# Patient Record
Sex: MALE | Race: WHITE | NOT HISPANIC OR LATINO | Employment: FULL TIME | URBAN - METROPOLITAN AREA
[De-identification: names, ages, dates, MRNs, and addresses within clinical notes are randomized per-mention and may not be internally consistent; named-entity substitution may affect disease eponyms.]

---

## 2022-10-05 ENCOUNTER — APPOINTMENT (OUTPATIENT)
Dept: RADIOLOGY | Facility: HOSPITAL | Age: 52
End: 2022-10-05

## 2022-10-05 ENCOUNTER — HOSPITAL ENCOUNTER (EMERGENCY)
Facility: HOSPITAL | Age: 52
Discharge: HOME | End: 2022-10-05
Attending: EMERGENCY MEDICINE

## 2022-10-05 VITALS
WEIGHT: 200 LBS | HEART RATE: 75 BPM | DIASTOLIC BLOOD PRESSURE: 97 MMHG | TEMPERATURE: 98.3 F | OXYGEN SATURATION: 98 % | SYSTOLIC BLOOD PRESSURE: 154 MMHG | BODY MASS INDEX: 30.41 KG/M2 | RESPIRATION RATE: 18 BRPM

## 2022-10-05 DIAGNOSIS — R03.0 ELEVATED BLOOD PRESSURE READING: ICD-10-CM

## 2022-10-05 DIAGNOSIS — E87.6 HYPOKALEMIA: ICD-10-CM

## 2022-10-05 DIAGNOSIS — B34.9 VIRAL SYNDROME: Primary | ICD-10-CM

## 2022-10-05 LAB
ALBUMIN SERPL BCP-MCNC: 4.4 G/DL (ref 3.5–5)
ALP SERPL-CCNC: 121 U/L (ref 34–104)
ALT SERPL W P-5'-P-CCNC: 14 U/L (ref 7–52)
ANION GAP SERPL CALCULATED.3IONS-SCNC: 9 MMOL/L (ref 4–13)
AST SERPL W P-5'-P-CCNC: 17 U/L (ref 13–39)
BASOPHILS # BLD AUTO: 0.08 THOUSANDS/ΜL (ref 0–0.1)
BASOPHILS NFR BLD AUTO: 1 % (ref 0–1)
BILIRUB SERPL-MCNC: 0.5 MG/DL (ref 0.2–1)
BUN SERPL-MCNC: 11 MG/DL (ref 5–25)
CALCIUM SERPL-MCNC: 9.8 MG/DL (ref 8.4–10.2)
CARDIAC TROPONIN I PNL SERPL HS: 5 NG/L
CHLORIDE SERPL-SCNC: 102 MMOL/L (ref 96–108)
CO2 SERPL-SCNC: 28 MMOL/L (ref 21–32)
CREAT SERPL-MCNC: 0.94 MG/DL (ref 0.6–1.3)
EOSINOPHIL # BLD AUTO: 0.03 THOUSAND/ΜL (ref 0–0.61)
EOSINOPHIL NFR BLD AUTO: 0 % (ref 0–6)
ERYTHROCYTE [DISTWIDTH] IN BLOOD BY AUTOMATED COUNT: 13 % (ref 11.6–15.1)
FLUAV RNA RESP QL NAA+PROBE: NEGATIVE
FLUBV RNA RESP QL NAA+PROBE: NEGATIVE
GFR SERPL CREATININE-BSD FRML MDRD: 92 ML/MIN/1.73SQ M
GLUCOSE SERPL-MCNC: 82 MG/DL (ref 65–140)
HCT VFR BLD AUTO: 49.4 % (ref 36.5–49.3)
HGB BLD-MCNC: 16.7 G/DL (ref 12–17)
IMM GRANULOCYTES # BLD AUTO: 0.03 THOUSAND/UL (ref 0–0.2)
IMM GRANULOCYTES NFR BLD AUTO: 0 % (ref 0–2)
LYMPHOCYTES # BLD AUTO: 3 THOUSANDS/ΜL (ref 0.6–4.47)
LYMPHOCYTES NFR BLD AUTO: 24 % (ref 14–44)
MCH RBC QN AUTO: 29.7 PG (ref 26.8–34.3)
MCHC RBC AUTO-ENTMCNC: 33.8 G/DL (ref 31.4–37.4)
MCV RBC AUTO: 88 FL (ref 82–98)
MONOCYTES # BLD AUTO: 0.62 THOUSAND/ΜL (ref 0.17–1.22)
MONOCYTES NFR BLD AUTO: 5 % (ref 4–12)
NEUTROPHILS # BLD AUTO: 8.85 THOUSANDS/ΜL (ref 1.85–7.62)
NEUTS SEG NFR BLD AUTO: 70 % (ref 43–75)
NRBC BLD AUTO-RTO: 0 /100 WBCS
PLATELET # BLD AUTO: 290 THOUSANDS/UL (ref 149–390)
PMV BLD AUTO: 9.3 FL (ref 8.9–12.7)
POTASSIUM SERPL-SCNC: 3.4 MMOL/L (ref 3.5–5.3)
PROT SERPL-MCNC: 7.2 G/DL (ref 6.4–8.4)
RBC # BLD AUTO: 5.63 MILLION/UL (ref 3.88–5.62)
RSV RNA RESP QL NAA+PROBE: NEGATIVE
SARS-COV-2 RNA RESP QL NAA+PROBE: NEGATIVE
SODIUM SERPL-SCNC: 139 MMOL/L (ref 135–147)
WBC # BLD AUTO: 12.61 THOUSAND/UL (ref 4.31–10.16)

## 2022-10-05 PROCEDURE — 84484 ASSAY OF TROPONIN QUANT: CPT | Performed by: EMERGENCY MEDICINE

## 2022-10-05 PROCEDURE — 71045 X-RAY EXAM CHEST 1 VIEW: CPT

## 2022-10-05 PROCEDURE — 96374 THER/PROPH/DIAG INJ IV PUSH: CPT

## 2022-10-05 PROCEDURE — 93005 ELECTROCARDIOGRAM TRACING: CPT

## 2022-10-05 PROCEDURE — 80053 COMPREHEN METABOLIC PANEL: CPT | Performed by: EMERGENCY MEDICINE

## 2022-10-05 PROCEDURE — 36415 COLL VENOUS BLD VENIPUNCTURE: CPT | Performed by: EMERGENCY MEDICINE

## 2022-10-05 PROCEDURE — 0241U HB NFCT DS VIR RESP RNA 4 TRGT: CPT | Performed by: EMERGENCY MEDICINE

## 2022-10-05 PROCEDURE — 96361 HYDRATE IV INFUSION ADD-ON: CPT

## 2022-10-05 PROCEDURE — 99284 EMERGENCY DEPT VISIT MOD MDM: CPT

## 2022-10-05 PROCEDURE — 99284 EMERGENCY DEPT VISIT MOD MDM: CPT | Performed by: EMERGENCY MEDICINE

## 2022-10-05 PROCEDURE — 96375 TX/PRO/DX INJ NEW DRUG ADDON: CPT

## 2022-10-05 PROCEDURE — 85025 COMPLETE CBC W/AUTO DIFF WBC: CPT | Performed by: EMERGENCY MEDICINE

## 2022-10-05 RX ORDER — POTASSIUM CHLORIDE 20 MEQ/1
40 TABLET, EXTENDED RELEASE ORAL ONCE
Status: COMPLETED | OUTPATIENT
Start: 2022-10-05 | End: 2022-10-05

## 2022-10-05 RX ORDER — KETOROLAC TROMETHAMINE 30 MG/ML
15 INJECTION, SOLUTION INTRAMUSCULAR; INTRAVENOUS ONCE
Status: COMPLETED | OUTPATIENT
Start: 2022-10-05 | End: 2022-10-05

## 2022-10-05 RX ORDER — DEXAMETHASONE SODIUM PHOSPHATE 10 MG/ML
10 INJECTION, SOLUTION INTRAMUSCULAR; INTRAVENOUS ONCE
Status: COMPLETED | OUTPATIENT
Start: 2022-10-05 | End: 2022-10-05

## 2022-10-05 RX ADMIN — KETOROLAC TROMETHAMINE 15 MG: 30 INJECTION, SOLUTION INTRAMUSCULAR at 12:18

## 2022-10-05 RX ADMIN — POTASSIUM CHLORIDE 40 MEQ: 1500 TABLET, EXTENDED RELEASE ORAL at 14:53

## 2022-10-05 RX ADMIN — SODIUM CHLORIDE 1000 ML: 0.9 INJECTION, SOLUTION INTRAVENOUS at 12:18

## 2022-10-05 RX ADMIN — DEXAMETHASONE SODIUM PHOSPHATE 10 MG: 10 INJECTION INTRAMUSCULAR; INTRAVENOUS at 12:18

## 2022-10-05 NOTE — DISCHARGE INSTRUCTIONS
Follow up with primary care  Please return to the emergency department if you develop worsening symptoms, severe pain, difficulty breathing, or anything else concerning to you

## 2022-10-05 NOTE — ED PROVIDER NOTES
History  Chief Complaint   Patient presents with    Flu Symptoms     Sore throat nasal congestion body aches x1 week  Using Nyquil without relief  Children in home also ill  No prior testing done     70-year-old male with no past medical history who presents for evaluation of viral symptoms  Patient reports that for the past week, he has had headache, nasal congestion, cough, body aches  He has been using NyQuil for his symptoms with minimal improvement  He endorses chest pain particularly when coughing an feels slightly short of breath  He denies nausea, vomiting, diarrhea, abdominal pain  He has not had fevers that he is aware of  His children are sick at home with similar symptoms  None       History reviewed  No pertinent past medical history  History reviewed  No pertinent surgical history  History reviewed  No pertinent family history  I have reviewed and agree with the history as documented  E-Cigarette/Vaping     E-Cigarette/Vaping Substances     Social History     Tobacco Use    Smoking status: Current Every Day Smoker     Packs/day: 0 50     Types: Cigarettes    Smokeless tobacco: Never Used   Substance Use Topics    Alcohol use: No    Drug use: No       Review of Systems   Constitutional: Positive for appetite change and fatigue  Negative for chills and fever  HENT: Positive for congestion and sore throat  Negative for trouble swallowing  Eyes: Negative for visual disturbance  Respiratory: Positive for cough and shortness of breath  Negative for chest tightness  Cardiovascular: Positive for chest pain  Negative for leg swelling  Gastrointestinal: Negative for abdominal distention, abdominal pain, diarrhea, nausea and vomiting  Genitourinary: Negative for difficulty urinating and flank pain  Musculoskeletal: Negative for back pain and gait problem  Skin: Negative for pallor and rash  Neurological: Positive for headaches   Negative for syncope, weakness and light-headedness  All other systems reviewed and are negative  Physical Exam  Physical Exam  Vitals and nursing note reviewed  Constitutional:       General: He is not in acute distress  Appearance: He is not ill-appearing  HENT:      Head: Normocephalic and atraumatic  Nose: Nose normal       Mouth/Throat:      Mouth: Mucous membranes are moist       Pharynx: No oropharyngeal exudate  Comments: Mild posterior oropharyngeal erythema  No peritonsillar abscess  Uvula midline  Eyes:      Extraocular Movements: Extraocular movements intact  Cardiovascular:      Rate and Rhythm: Normal rate and regular rhythm  Heart sounds: No murmur heard  No friction rub  No gallop  Pulmonary:      Effort: Pulmonary effort is normal       Breath sounds: Normal breath sounds  No wheezing, rhonchi or rales  Abdominal:      General: There is no distension  Palpations: Abdomen is soft  Tenderness: There is no abdominal tenderness  Musculoskeletal:         General: No swelling or tenderness  Normal range of motion  Cervical back: Normal range of motion and neck supple  Skin:     General: Skin is warm and dry  Coloration: Skin is not pale  Findings: No rash  Neurological:      General: No focal deficit present  Mental Status: He is alert and oriented to person, place, and time     Psychiatric:         Behavior: Behavior normal          Vital Signs  ED Triage Vitals [10/05/22 1149]   Temperature Pulse Respirations Blood Pressure SpO2   98 3 °F (36 8 °C) 81 18 (!) 170/105 92 %      Temp Source Heart Rate Source Patient Position - Orthostatic VS BP Location FiO2 (%)   Oral Monitor Sitting Right arm --      Pain Score       8           Vitals:    10/05/22 1149 10/05/22 1428   BP: (!) 170/105 154/97   Pulse: 81 75   Patient Position - Orthostatic VS: Sitting Sitting         Visual Acuity      ED Medications  Medications   sodium chloride 0 9 % bolus 1,000 mL (0 mL Intravenous Stopped 10/5/22 1443)   ketorolac (TORADOL) injection 15 mg (15 mg Intravenous Given 10/5/22 1218)   dexamethasone (PF) (DECADRON) injection 10 mg (10 mg Intravenous Given 10/5/22 1218)   potassium chloride (K-DUR,KLOR-CON) CR tablet 40 mEq (40 mEq Oral Given 10/5/22 1453)       Diagnostic Studies  Results Reviewed     Procedure Component Value Units Date/Time    FLU/RSV/COVID - if FLU/RSV clinically relevant [[id number]]  (Normal) Collected: 10/05/22 1216    Lab Status: Final result Specimen: Nares from Nose Updated: 10/05/22 1344     SARS-CoV-2 Negative     INFLUENZA A PCR Negative     INFLUENZA B PCR Negative     RSV PCR Negative    Narrative:      FOR PEDIATRIC PATIENTS - copy/paste COVID Guidelines URL to browser: [email or web address]    SARS-CoV-2 assay is a Nucleic Acid Amplification assay intended for the  qualitative detection of nucleic acid from SARS-CoV-2 in nasopharyngeal  swabs  Results are for the presumptive identification of SARS-CoV-2 RNA  Positive results are indicative of infection with SARS-CoV-2, the virus  causing COVID-19, but do not rule out bacterial infection or co-infection  with other viruses  Laboratories within the [place] and its  territories are required to report all positive results to the appropriate  public health authorities  Negative results do not preclude SARS-CoV-2  infection and should not be used as the sole basis for treatment or other  patient management decisions  Negative results must be combined with  clinical observations, patient history, and epidemiological information  This test has not been FDA cleared or approved  This test has been authorized by FDA under an Emergency Use Authorization  (EUA)   This test is only authorized for the duration of time the  declaration that circumstances exist justifying the authorization of the  emergency use of an in vitro diagnostic tests for detection of SARS-CoV-2  virus and/or diagnosis of COVID-19 infection under section 564(b)(1) of  the Act, 21 U  S C  377SNP-2(B)(6), unless the authorization is terminated  or revoked sooner  The test has been validated but independent review by FDA  and CLIA is pending  Test performed using [employer] GeneXpert: This RT-PCR assay targets N2,  a region unique to SARS-CoV-2  A conserved region in the E-gene was chosen  for pan-Sarbecovirus detection which includes SARS-CoV-2  According to CMS-2020-01-R, this platform meets the definition of high-throughput technology      HS Troponin 0hr (reflex protocol) [[id number]]  (Normal) Collected: 10/05/22 1216    Lab Status: Final result Specimen: Blood from Arm, Left Updated: 10/05/22 1324     hs TnI 0hr 5 ng/L     Comprehensive metabolic panel [[id number]]  (Abnormal) Collected: 10/05/22 1216    Lab Status: Final result Specimen: Blood from Arm, Left Updated: 10/05/22 1316     Sodium 139 mmol/L      Potassium 3 4 mmol/L      Chloride 102 mmol/L      CO2 28 mmol/L      ANION GAP 9 mmol/L      BUN 11 mg/dL      Creatinine 0 94 mg/dL      Glucose 82 mg/dL      Calcium 9 8 mg/dL      AST 17 U/L      ALT 14 U/L      Alkaline Phosphatase 121 U/L      Total Protein 7 2 g/dL      Albumin 4 4 g/dL      Total Bilirubin 0 50 mg/dL      eGFR 92 ml/min/1 73sq m     Narrative:      Digna guidelines for Chronic Kidney Disease (CKD):     Stage 1 with normal or high GFR (GFR > 90 mL/min/1 73 square meters)    Stage 2 Mild CKD (GFR = 60-89 mL/min/1 73 square meters)    Stage 3A Moderate CKD (GFR = 45-59 mL/min/1 73 square meters)    Stage 3B Moderate CKD (GFR = 30-44 mL/min/1 73 square meters)    Stage 4 Severe CKD (GFR = 15-29 mL/min/1 73 square meters)    Stage 5 End Stage CKD (GFR <15 mL/min/1 73 square meters)  Note: GFR calculation is accurate only with a steady state creatinine    CBC and differential [[id number]]  (Abnormal) Collected: 10/05/22 1216    Lab Status: Final result Specimen: Blood from Arm, Left Updated: 10/05/22 1258     WBC 12 61 Thousand/uL      RBC 5 63 Million/uL      Hemoglobin 16 7 g/dL      Hematocrit 49 4 %      MCV 88 fL      MCH 29 7 pg      MCHC 33 8 g/dL      RDW 13 0 %      MPV 9 3 fL      Platelets 699 Thousands/uL      nRBC 0 /100 WBCs      Neutrophils Relative 70 %      Immat GRANS % 0 %      Lymphocytes Relative 24 %      Monocytes Relative 5 %      Eosinophils Relative 0 %      Basophils Relative 1 %      Neutrophils Absolute 8 85 Thousands/µL      Immature Grans Absolute 0 03 Thousand/uL      Lymphocytes Absolute 3 00 Thousands/µL      Monocytes Absolute 0 62 Thousand/µL      Eosinophils Absolute 0 03 Thousand/µL      Basophils Absolute 0 08 Thousands/µL                  XR chest 1 view portable   Final Result by Zac French MD (10/05 8788)      No acute cardiopulmonary disease  Workstation performed: [id number]                    Procedures  Procedures         ED Course  ED Course as of 10/05/22 1621   Wed Oct 05, 2022   1218 Procedure Note: EKG  Date/Time: 10/05/22 12:11 PM   Interpreted by: Mic Kraus  Indications / Diagnosis: CP  ECG reviewed by me, the ED Provider: yes   The EKG demonstrates:  Rhythm: rate 64, normal sinus  Intervals: normal intervals  Axis: normal axis  QRS/Blocks: normal QRS  ST Changes: No acute ST Changes, no STD/RICHARD       1259 CBC and differential(!)   1317 Comprehensive metabolic panel(!)   9706 hs TnI 0hr: 5                                             MDM  Number of Diagnoses or Management Options  Elevated blood pressure reading: new and requires workup  Hypokalemia: new and requires workup  Viral syndrome: new and requires workup  Diagnosis management comments: 59-year-old male who presents for evaluation of viral symptoms  Will obtain cardiac workup given intermittent chest pain with coughing and shortness of breath  Vitals stable apart from hypertension which improved    Labs remarkable for mild hypokalemia which was orally repleted  Advised follow-up primary care physician  Return precautions discussed  Amount and/or Complexity of Data Reviewed  Clinical lab tests: ordered and reviewed  Tests in the radiology section of CPT®: ordered and reviewed  Review and summarize past medical records: yes    Risk of Complications, Morbidity, and/or Mortality  Presenting problems: high  Diagnostic procedures: low  Management options: low    Patient Progress  Patient progress: stable      Disposition  Final diagnoses:   Hypokalemia   Elevated blood pressure reading   Viral syndrome     Time reflects when diagnosis was documented in both MDM as applicable and the Disposition within this note     Time User Action Codes Description Comment    10/5/2022  1:18 PM Shaune Salines Add [E87 6] Hypokalemia     10/5/2022  1:18 PM Shaune Salines Add [R03 0] Elevated blood pressure reading     10/5/2022  1:18 PM Shaune Salines Add [B34 9] Viral syndrome     10/5/2022  1:18 PM Shaune Salines Modify [E87 6] Hypokalemia     10/5/2022  1:18 PM Shaune Salines Modify [B34 9] Viral syndrome       ED Disposition     ED Disposition   Discharge    Condition   Stable    Date/Time   Wed Oct 5, 2022  2:43 PM    Comment   Porfirio Carroll discharge to home/self care  Follow-up Information    None         There are no discharge medications for this patient            PDMP Review     None          ED Provider  Electronically Signed by           Trixie Scales MD  10/05/22 7817

## 2022-10-06 LAB
ATRIAL RATE: 64 BPM
P AXIS: 49 DEGREES
PR INTERVAL: 164 MS
QRS AXIS: 11 DEGREES
QRSD INTERVAL: 94 MS
QT INTERVAL: 389 MS
QTC INTERVAL: 402 MS
T WAVE AXIS: 10 DEGREES
VENTRICULAR RATE: 64 BPM

## 2022-10-06 PROCEDURE — 93010 ELECTROCARDIOGRAM REPORT: CPT | Performed by: INTERNAL MEDICINE

## 2023-02-15 ENCOUNTER — HOSPITAL ENCOUNTER (EMERGENCY)
Facility: HOSPITAL | Age: 53
Discharge: HOME | End: 2023-02-15
Attending: EMERGENCY MEDICINE

## 2023-02-15 VITALS
OXYGEN SATURATION: 94 % | TEMPERATURE: 98.5 F | RESPIRATION RATE: 18 BRPM | HEART RATE: 102 BPM | DIASTOLIC BLOOD PRESSURE: 89 MMHG | SYSTOLIC BLOOD PRESSURE: 131 MMHG

## 2023-02-15 DIAGNOSIS — J02.9 SORE THROAT: Primary | ICD-10-CM

## 2023-02-15 DIAGNOSIS — R05.9 COUGH: ICD-10-CM

## 2023-02-15 DIAGNOSIS — R52 BODY ACHES: ICD-10-CM

## 2023-02-15 LAB
FLUAV RNA RESP QL NAA+PROBE: NEGATIVE
FLUBV RNA RESP QL NAA+PROBE: NEGATIVE
RSV RNA RESP QL NAA+PROBE: NEGATIVE
S PYO DNA THROAT QL NAA+PROBE: NOT DETECTED
SARS-COV-2 RNA RESP QL NAA+PROBE: NEGATIVE

## 2023-02-15 RX ORDER — ACETAMINOPHEN 325 MG/1
975 TABLET ORAL ONCE
Status: COMPLETED | OUTPATIENT
Start: 2023-02-15 | End: 2023-02-15

## 2023-02-15 RX ORDER — IBUPROFEN 600 MG/1
600 TABLET ORAL ONCE
Status: COMPLETED | OUTPATIENT
Start: 2023-02-15 | End: 2023-02-15

## 2023-02-15 RX ORDER — BENZONATATE 100 MG/1
100 CAPSULE ORAL ONCE
Status: COMPLETED | OUTPATIENT
Start: 2023-02-15 | End: 2023-02-15

## 2023-02-15 RX ADMIN — ACETAMINOPHEN 975 MG: 325 TABLET, FILM COATED ORAL at 22:50

## 2023-02-15 RX ADMIN — IBUPROFEN 600 MG: 600 TABLET, FILM COATED ORAL at 22:50

## 2023-02-15 RX ADMIN — BENZONATATE 100 MG: 100 CAPSULE ORAL at 22:50

## 2023-02-15 NOTE — Clinical Note
Nuno Clintmaritza was seen and treated in our emergency department on 2/15/2023  Diagnosis:     Pamela Tompkins  may return to work on return date  He may return on this date: 02/16/2023    Patient is excused from work 2/13 and 2/14  If you have any questions or concerns, please don't hesitate to call        Brennen Sweet MD    ______________________________           _______________          _______________  Hospital Representative                              Date                                Time

## 2023-02-16 NOTE — ED PROVIDER NOTES
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  Patient states that he took Tylenol 1 day ago for symptom control  Patient notes no definite clear exacerbating or alleviating factors for his symptoms  Patient notes no chest pain, shortness of breath, vomiting  None       History reviewed  No pertinent past medical history  History reviewed  No pertinent surgical history  History reviewed  No pertinent family history  I have reviewed and agree with the history as documented  E-Cigarette/Vaping     E-Cigarette/Vaping Substances     Social History     Tobacco Use   • Smoking status: Every Day     Packs/day: 0 50     Types: Cigarettes   • Smokeless tobacco: Never   Substance Use Topics   • Alcohol use: No   • Drug use: No       Review of Systems   Constitutional: Positive for chills  Negative for unexpected weight change  HENT: Positive for sore throat  Negative for drooling and trouble swallowing  Eyes: Negative for pain and visual disturbance  Respiratory: Positive for cough  Negative for shortness of breath  Cardiovascular: Negative for chest pain and palpitations  Gastrointestinal: Negative for abdominal pain and vomiting  Genitourinary: Negative for decreased urine volume and difficulty urinating  Musculoskeletal: Positive for myalgias  Negative for gait problem  Skin: Negative for color change and rash  Neurological: Negative for seizures and syncope  Psychiatric/Behavioral: Negative for agitation and confusion  Physical Exam  Physical Exam  Vitals and nursing note reviewed  Constitutional:       General: He is not in acute distress  Appearance: He is well-developed  HENT:      Head: Normocephalic and atraumatic  Right Ear: External ear normal       Left Ear: External ear normal       Nose: Nose normal       Mouth/Throat:      Mouth: Mucous membranes are moist       Pharynx: Posterior oropharyngeal erythema present  No oropharyngeal exudate  Eyes:      General: No scleral icterus  Conjunctiva/sclera: Conjunctivae normal    Cardiovascular:      Rate and Rhythm: Regular rhythm  Tachycardia present  Heart sounds: No murmur heard  Pulmonary:      Effort: Pulmonary effort is normal  No respiratory distress  Breath sounds: Normal breath sounds  Abdominal:      General: There is no distension  Palpations: Abdomen is soft  Tenderness: There is no abdominal tenderness  Musculoskeletal:         General: No deformity or signs of injury  Cervical back: Normal range of motion and neck supple  Lymphadenopathy:      Cervical: No cervical adenopathy  Skin:     General: Skin is warm and dry  Neurological:      General: No focal deficit present  Mental Status: He is alert  Cranial Nerves: No cranial nerve deficit  Sensory: No sensory deficit  Psychiatric:         Mood and Affect: Mood normal          Thought Content:  Thought content normal          Vital Signs  ED Triage Vitals [02/15/23 2229]   Temperature Pulse Respirations Blood Pressure SpO2   98 5 °F (36 9 °C) 102 18 131/89 94 %      Temp Source Heart Rate Source Patient Position - Orthostatic VS BP Location FiO2 (%)   Oral Monitor Lying Right arm --      Pain Score       --           Vitals:    02/15/23 2229   BP: 131/89   Pulse: 102   Patient Position - Orthostatic VS: Lying         Visual Acuity      ED Medications  Medications   ibuprofen (MOTRIN) tablet 600 mg (600 mg Oral Given 2/15/23 2250)   acetaminophen (TYLENOL) tablet 975 mg (975 mg Oral Given 2/15/23 2250) benzonatate (TESSALON PERLES) capsule 100 mg (100 mg Oral Given 2/15/23 2250)       Diagnostic Studies  Results Reviewed     Procedure Component Value Units Date/Time    Strep A PCR [[id number]] Collected: 02/15/23 2244    Lab Status: In process Specimen: Throat Updated: 02/15/23 2246    COVID/FLU/RSV [[id number]] Collected: 02/15/23 2231    Lab Status: In process Specimen: Nares from Nose Updated: 02/15/23 2232                 No orders to display              Procedures  Procedures         ED Course                                             Medical Decision Making  Patient is a 79-year-old male seen in the emergency department with concern for sore throat, cough, body aches  Strep swab was ordered in the emergency department  COVID-19/influenza/RSV swab was ordered in the emergency department  Patient was treated with medication for symptom control  Evaluation is consistent with likely viral syndrome  There is low suspicion for acute pneumonia based on evaluation  Plan to have patient follow up with PCP  Patient stable for discharge home  Discharge instructions were reviewed with patient  Body aches: acute illness or injury  Cough: acute illness or injury  Sore throat: acute illness or injury  Amount and/or Complexity of Data Reviewed  Labs: ordered  Risk  OTC drugs  Prescription drug management            Disposition  Final diagnoses:   Sore throat   Cough   Body aches     Time reflects when diagnosis was documented in both MDM as applicable and the Disposition within this note     Time User Action Codes Description Comment    2/15/2023 10:46 PM Genny Veronica Add [R05 9] Cough     2/15/2023 10:46 PM Gennyhung Rodriguezter Remove [R05 9] Cough     2/15/2023 10:46 PM Genny Rodriguezter Add [J02 9] Sore throat     2/15/2023 10:46 PM Genny Veronica Add [R05 9] Cough     2/15/2023 10:46 PM Genny Veronica Add [R52] Body aches       ED Disposition     ED Disposition   Discharge    Condition   Stable    Date/Time Wed Feb 15, 2023 10:46 PM    Comment   Cynthia Gapenelope discharge to home/self care  Follow-up Information     Follow up With Specialties Details Why Contact Info Additional Information    Your primary doctor  Call in 1 day       [place] Call  As needed [address] [phone]  [id number]  , [place], [place], [address]          There are no discharge medications for this patient  No discharge procedures on file      PDMP Review     None          ED Provider  Electronically Signed by           Jabier Urias MD  02/15/23 6406

## 2023-05-15 PROBLEM — N20.0 NEPHROLITHIASIS: Status: ACTIVE | Noted: 2023-05-15

## 2023-05-15 PROBLEM — Q64.4 URACHAL REMNANT: Status: ACTIVE | Noted: 2023-05-15

## 2023-05-15 PROBLEM — N30.01 ACUTE CYSTITIS WITH HEMATURIA: Status: ACTIVE | Noted: 2023-05-15

## 2023-05-15 PROBLEM — R31.0 GROSS HEMATURIA: Status: ACTIVE | Noted: 2023-05-15

## 2023-05-15 NOTE — PROGRESS NOTES
Referring Physician: No primary care provider on file  A copy of this note was sent to the referring physician  Diagnoses and all orders for this visit:    Gross hematuria  -     POCT urine dip    Acute cystitis with hematuria    Nephrolithiasis    Urachal remnant  -     Ambulatory Referral to Urology  -     Case request operating room: EXCISION URACHAL REMNANT/SINUS,LARPAROSCOPIC WITH ROBOTICS, [address]; Standing  -     Case request operating room: EXCISION URACHAL REMNANT/SINUS,LARPAROSCOPIC WITH ROBOTICS, CYSTECTOMY PARTIAL,LAPAROSCOPIC W ROBOT    UTI (urinary tract infection)  -     Ambulatory Referral to Urology    Other orders  -     Place sequential compression device; Standing  -     Nursing communication Patient instructions: Please drink 1 bottle 1 hour after dinner and 1 bottle 1 hour before bed on the night before your surgery  Please drink 1 hour before arriving to hospital for surgery; Standing  -     acetaminophen (TYLENOL) tablet 975 mg  -     gabapentin (NEURONTIN) capsule 100 mg  -     bupivacaine liposomal (EXPAREL) 1 3 % injection 20 mL            Assessment and plan:       1  Urachal remnant  -2 focal calcifications are present  -Extends from dome to umbilicus  -Noncystic, no evidence of infection on imaging    2  Gross hematuria  -Ongoing tobacco use is noted    3  Urinary tract infection    4  History of nephrolithiasis    I recommended flexible cystoscopy for further evaluation  Discussed the risks benefits and alternatives to this diagnostic procedure  Risks include but are not limited to hematuria, pain, urinary tract infection, stricture formation, possible need for hospitalization  Patient verbalized understanding and has elected to proceed  Cystoscopy will be scheduled in the near future      Regarding the urachal remnant, there are 2 calcifications present along the course of this which have the typical radiographic feature that is associated with risk of adenocarcinoma  As such I have recommended proceeding with an excision of the urachal remnant which can be best accomplished and in a minimally invasive fashion in the form of a robotic excision including partial cystectomy  With regards to surgical resection, we discussed the benefits and risks, including but not limited to bleeding which may or may not require blood transfusion, infection, injury to other structures (bladder, ureters, rectum, nerves, & vascular /neural structures), ileus, DVT/PE, MI, CVA  Discussed the patient will likely be hospitalized overnight and require a Clifford catheter for approximately 10 days postop  I discussed work excuse and will provide a note once surgery is scheduled  Finally we discussed that if adenocarcinoma of the urachus is identified on final pathology he may require a follow-up procedure to remove the umbilicus  Comprehensive plan is as follows:  - We will get the ball rolling on scheduling a robotic excision of urachal remnant with partial cystectomy  - Patient counseled on the rest preoperative surgical pathway  - I will see him back for a preoperative office cystoscopy to evaluate the lumen of the bladder and determine if there are any concomitant issues given his prior episode of gross hematuria and smoking history    Bacilio Edmond MD      Chief Complaint     Urachal remnant, hematuria      History of Present Illness     Steven Cheng is a 46 y o  referred in consultation for hematuria, ureteral remnant present on imaging    Detailed Urologic History     - please refer to HPI    Review of Systems     Review of Systems   Constitutional: Negative for activity change and fatigue  HENT: Negative for congestion  Eyes: Negative for visual disturbance  Respiratory: Negative for shortness of breath and wheezing  Cardiovascular: Negative for chest pain and leg swelling  Gastrointestinal: Negative for abdominal pain     Endocrine: "Negative for polyuria  Genitourinary: Negative for dysuria, flank pain, hematuria and urgency  Musculoskeletal: Negative for back pain  Allergic/Immunologic: Negative for immunocompromised state  Neurological: Negative for dizziness and numbness  Psychiatric/Behavioral: Negative for dysphoric mood  All other systems reviewed and are negative  Allergies     No Known Allergies    Physical Exam       Physical Exam  Constitutional:       General: He is not in acute distress  Appearance: He is well-developed  HENT:      Head: Normocephalic and atraumatic  Cardiovascular:      Comments: Negative lower extremity edema  Pulmonary:      Effort: Pulmonary effort is normal       Breath sounds: Normal breath sounds  Abdominal:      Palpations: Abdomen is soft  Musculoskeletal:         General: Normal range of motion  Cervical back: Normal range of motion  Skin:     General: Skin is warm  Neurological:      Mental Status: He is alert and oriented to person, place, and time  Psychiatric:         Behavior: Behavior normal            Vital Signs  Vitals:    05/16/23 1048   BP: 116/78   BP Location: Left arm   Patient Position: Sitting   Cuff Size: Adult   Pulse: 65   Resp: 18   SpO2: 98%   Weight: 89 8 kg (198 lb)   Height: 5' 9\" (1 753 m)         Current Medications     No current outpatient medications on file  Active Problems     Patient Active Problem List   Diagnosis   • Gross hematuria   • Acute cystitis with hematuria   • Nephrolithiasis   • Urachal remnant         Past Medical History     History reviewed  No pertinent past medical history  Surgical History     History reviewed  No pertinent surgical history  Family History     History reviewed  No pertinent family history        Social History     Social History     Social History     Tobacco Use   Smoking Status Every Day   • Packs/day: 0 50   • Types: Cigarettes   Smokeless Tobacco Never         Pertinent Lab " Values     Lab Results   Component Value Date    CREATININE 1 03 04/11/2023       No results found for: PSA    @RESULTRCNT(1H])@      Pertinent Imaging       FINDINGS:  Absence of intravenous contrast enhancement limits evaluation of the abdominal viscera      ABDOMEN     LOWER CHEST:  No clinically significant abnormality identified in the visualized lower chest      LIVER/BILIARY TREE:  Liver is enlarged  No focal hepatic lesions are noted  Hepatic contours are within normal limits  No biliary dilatation      GALLBLADDER:  No calcified gallstones  No pericholecystic inflammatory change      SPLEEN:  Unremarkable      PANCREAS:  Unremarkable      ADRENAL GLANDS:  Unremarkable      KIDNEYS/URETERS:  Unremarkable  No hydronephrosis      STOMACH AND BOWEL:  There is colonic diverticulosis without evidence of acute diverticulitis      APPENDIX:  No findings to suggest appendicitis      ABDOMINOPELVIC CAVITY:  No ascites  No pneumoperitoneum  No lymphadenopathy      VESSELS:  Unremarkable for patient's age      PELVIS     REPRODUCTIVE ORGANS:  The prostate is enlarged      URINARY BLADDER:  Diffuse bladder wall thickening with perivesical fat stranding consistent with cystitis  Vesicourachal diverticulum, which is increased in size since the prior study      ABDOMINAL WALL/INGUINAL REGIONS:  There is a small fat-containing umbilical hernia, unchanged from previous examination      OSSEOUS STRUCTURES:  No acute fracture or destructive osseous lesion  Bilateral pars defects at L5 with unchanged grade 1 anterolisthesis at L5-S1      IMPRESSION:     Acute cystitis        Congenital urachal remnant, larger since the prior exam   Consider urology referral as this lesion carries increased risk of malignancy      Colonic diverticulosis without diverticulitis         The study was marked in EPIC for immediate notification        Portions of the record may have been created with voice recognition software    Occasional "wrong word or \"sound a like\" substitutions may have occurred due to the inherent limitations of voice recognition software  In addition some of the content generated from this outpatient encounter includes information designed for patient education and/or communication back to the referring provider  Read the chart carefully and recognize, using context, where substitutions have occurred      "

## 2023-05-16 ENCOUNTER — OFFICE VISIT (OUTPATIENT)
Dept: UROLOGY | Facility: CLINIC | Age: 53
End: 2023-05-16

## 2023-05-16 ENCOUNTER — TELEPHONE (OUTPATIENT)
Dept: UROLOGY | Facility: CLINIC | Age: 53
End: 2023-05-16

## 2023-05-16 VITALS
DIASTOLIC BLOOD PRESSURE: 78 MMHG | WEIGHT: 198 LBS | HEIGHT: 69 IN | OXYGEN SATURATION: 98 % | RESPIRATION RATE: 18 BRPM | SYSTOLIC BLOOD PRESSURE: 116 MMHG | BODY MASS INDEX: 29.33 KG/M2 | HEART RATE: 65 BPM

## 2023-05-16 DIAGNOSIS — N39.0 UTI (URINARY TRACT INFECTION): ICD-10-CM

## 2023-05-16 DIAGNOSIS — N30.01 ACUTE CYSTITIS WITH HEMATURIA: ICD-10-CM

## 2023-05-16 DIAGNOSIS — R31.0 GROSS HEMATURIA: Primary | ICD-10-CM

## 2023-05-16 DIAGNOSIS — N20.0 NEPHROLITHIASIS: ICD-10-CM

## 2023-05-16 DIAGNOSIS — Q64.4 URACHAL REMNANT: ICD-10-CM

## 2023-05-16 LAB
SL AMB  POCT GLUCOSE, UA: NORMAL
SL AMB LEUKOCYTE ESTERASE,UA: NORMAL
SL AMB POCT BILIRUBIN,UA: NORMAL
SL AMB POCT BLOOD,UA: NORMAL
SL AMB POCT CLARITY,UA: CLEAR
SL AMB POCT COLOR,UA: YELLOW
SL AMB POCT KETONES,UA: NORMAL
SL AMB POCT NITRITE,UA: NORMAL
SL AMB POCT PH,UA: 5
SL AMB POCT SPECIFIC GRAVITY,UA: 1.03
SL AMB POCT URINE PROTEIN: NORMAL
SL AMB POCT UROBILINOGEN: 0.2

## 2023-05-16 NOTE — TELEPHONE ENCOUNTER
Patient needs ASAP cysto with Dr Zane Wells  Please advise on an appointment day and time for patient  Thank you! Provider note:      Return for schedule robotic surgery, schedule office cystp w [id number] prior (ASAP)

## 2023-05-17 RX ORDER — GABAPENTIN 100 MG/1
100 CAPSULE ORAL ONCE
OUTPATIENT
Start: 2023-05-17 | End: 2023-05-17

## 2023-05-17 RX ORDER — ACETAMINOPHEN 325 MG/1
975 TABLET ORAL ONCE
OUTPATIENT
Start: 2023-05-17 | End: 2023-05-17

## 2023-05-17 NOTE — TELEPHONE ENCOUNTER
Patient returning call      Patient confirming cysto appt on 5/23 at 10:30 with Dr Chrissy Ace in [facility] [place]

## 2023-05-24 NOTE — TELEPHONE ENCOUNTER
Pt under care of: Mone Harkins     Last Seen: 5/16/23    Reason for call: Patient calling in to reschedule his Cysto with Elias Finley  He states this needs to be done prior to him being able to have surgery  I did make him aware of next open availability and patient is looking for something sooner  Patient requesting call back         Pt can be reached at: [phone]

## 2023-05-26 NOTE — TELEPHONE ENCOUNTER
Patient returned call  I let him know the reschedule date and time   He is okay with it and confirmed he will be able to make that appointment

## 2023-05-26 NOTE — TELEPHONE ENCOUNTER
I attempted to call patient and VM box is not set up yet  If he calls back, please reiterate to him below and confirm his appt next week

## 2023-05-30 ENCOUNTER — TELEPHONE (OUTPATIENT)
Dept: UROLOGY | Facility: CLINIC | Age: 53
End: 2023-05-30

## 2023-05-30 NOTE — TELEPHONE ENCOUNTER
Patient seen recently for the first time following an ER visit for gross hematuria  His imaging did reveal a urachal remnant with calcification  We discussed options and I recommended moving forward with a robotic excision  We discussed the procedure and I believe the patient provided informed consent    However since that time he has no showed for 2 preoperative visits including cystoscopy  He no-showed on May 23  He was rescheduled for today and again no showed    Please cancel all follow-ups and do not hold a surgical spot for this patient unless he follows up  He should be sent a certified letter and I would not reschedule any additional appointments unless he reinstitutes contact with our office

## 2023-05-31 ENCOUNTER — TELEPHONE (OUTPATIENT)
Dept: UROLOGY | Facility: CLINIC | Age: 53
End: 2023-05-31

## 2023-05-31 NOTE — TELEPHONE ENCOUNTER
Aretha West 1 hour ago (8:21 AM)     SC  Is patient okay to be rescheduled? It appears he has no showed twice for Dr Indira Edmond  Note          Jenniffer Rosenbaum routed conversation to  [facility] 2 hours ago (7:10 AM)     Stas Loza 2 hours ago (7:10 AM)     LS  Patient called in to reschedule his missed appointment  Please call to schedule            Note

## 2023-06-01 NOTE — TELEPHONE ENCOUNTER
PT IS SCHEDULED FOR 9/29/23 AT 2:00PM  TRIED TO REACH THE PT BUT VM NOT SET UP YET  IF PT CALLS BACK PLEASE GIVE HIM THIS APPT DATE AND TIME  THERE IS NOTHING SOONER WITH DR Josh Edwards  AS ADVISED BELOW  NEXT AVAILABLE WAS SCHEDULED  No

## 2023-06-02 NOTE — TELEPHONE ENCOUNTER
Patient calling in questioning time and date for next apt  Patient was made aware of apt on 9/29/23 with ZG  Patient states he is concerned to have to wait till then  Patient requesting a call back from office nurses  Patient was made aware of encounter below with that being the next available

## 2023-07-14 PROBLEM — N30.01 ACUTE CYSTITIS WITH HEMATURIA: Status: RESOLVED | Noted: 2023-05-15 | Resolved: 2023-07-14

## 2023-08-18 ENCOUNTER — HOSPITAL ENCOUNTER (EMERGENCY)
Facility: HOSPITAL | Age: 53
Discharge: HOME | End: 2023-08-18
Attending: EMERGENCY MEDICINE
Payer: MEDICARE

## 2023-08-18 ENCOUNTER — APPOINTMENT (EMERGENCY)
Dept: CT IMAGING | Facility: HOSPITAL | Age: 53
End: 2023-08-18
Payer: MEDICARE

## 2023-08-18 VITALS
HEART RATE: 56 BPM | SYSTOLIC BLOOD PRESSURE: 155 MMHG | RESPIRATION RATE: 18 BRPM | TEMPERATURE: 98.2 F | DIASTOLIC BLOOD PRESSURE: 101 MMHG | HEIGHT: 69 IN | BODY MASS INDEX: 28.14 KG/M2 | WEIGHT: 190 LBS | OXYGEN SATURATION: 98 %

## 2023-08-18 DIAGNOSIS — R10.13 EPIGASTRIC PAIN: Primary | ICD-10-CM

## 2023-08-18 LAB
2HR DELTA HS TROPONIN: 0 NG/L
ALBUMIN SERPL BCP-MCNC: 4.1 G/DL (ref 3.5–5)
ALP SERPL-CCNC: 169 U/L (ref 34–104)
ALT SERPL W P-5'-P-CCNC: 35 U/L (ref 7–52)
ANION GAP SERPL CALCULATED.3IONS-SCNC: 7 MMOL/L
AST SERPL W P-5'-P-CCNC: 52 U/L (ref 13–39)
BASOPHILS # BLD AUTO: 0.06 THOUSANDS/ÂΜL (ref 0–0.1)
BASOPHILS NFR BLD AUTO: 1 % (ref 0–1)
BILIRUB SERPL-MCNC: 0.46 MG/DL (ref 0.2–1)
BUN SERPL-MCNC: 13 MG/DL (ref 5–25)
CALCIUM SERPL-MCNC: 9.2 MG/DL (ref 8.4–10.2)
CARDIAC TROPONIN I PNL SERPL HS: 6 NG/L
CARDIAC TROPONIN I PNL SERPL HS: 6 NG/L
CHLORIDE SERPL-SCNC: 104 MMOL/L (ref 96–108)
CO2 SERPL-SCNC: 31 MMOL/L (ref 21–32)
CREAT SERPL-MCNC: 1.12 MG/DL (ref 0.6–1.3)
EOSINOPHIL # BLD AUTO: 0.18 THOUSAND/ÂΜL (ref 0–0.61)
EOSINOPHIL NFR BLD AUTO: 3 % (ref 0–6)
ERYTHROCYTE [DISTWIDTH] IN BLOOD BY AUTOMATED COUNT: 13.1 % (ref 11.6–15.1)
GFR SERPL CREATININE-BSD FRML MDRD: 75 ML/MIN/1.73SQ M
GLUCOSE SERPL-MCNC: 110 MG/DL (ref 65–140)
HCT VFR BLD AUTO: 51.2 % (ref 36.5–49.3)
HGB BLD-MCNC: 16.7 G/DL (ref 12–17)
IMM GRANULOCYTES # BLD AUTO: 0.02 THOUSAND/UL (ref 0–0.2)
IMM GRANULOCYTES NFR BLD AUTO: 0 % (ref 0–2)
LIPASE SERPL-CCNC: 32 U/L (ref 11–82)
LYMPHOCYTES # BLD AUTO: 1.91 THOUSANDS/ÂΜL (ref 0.6–4.47)
LYMPHOCYTES NFR BLD AUTO: 28 % (ref 14–44)
MCH RBC QN AUTO: 29.9 PG (ref 26.8–34.3)
MCHC RBC AUTO-ENTMCNC: 32.6 G/DL (ref 31.4–37.4)
MCV RBC AUTO: 92 FL (ref 82–98)
MONOCYTES # BLD AUTO: 0.48 THOUSAND/ÂΜL (ref 0.17–1.22)
MONOCYTES NFR BLD AUTO: 7 % (ref 4–12)
NEUTROPHILS # BLD AUTO: 4.12 THOUSANDS/ÂΜL (ref 1.85–7.62)
NEUTS SEG NFR BLD AUTO: 61 % (ref 43–75)
NRBC BLD AUTO-RTO: 0 /100 WBCS
PLATELET # BLD AUTO: 241 THOUSANDS/UL (ref 149–390)
PMV BLD AUTO: 9.2 FL (ref 8.9–12.7)
POTASSIUM SERPL-SCNC: 4.2 MMOL/L (ref 3.5–5.3)
PROT SERPL-MCNC: 6.6 G/DL (ref 6.4–8.4)
RBC # BLD AUTO: 5.59 MILLION/UL (ref 3.88–5.62)
SODIUM SERPL-SCNC: 142 MMOL/L (ref 135–147)
WBC # BLD AUTO: 6.77 THOUSAND/UL (ref 4.31–10.16)

## 2023-08-18 PROCEDURE — 83690 ASSAY OF LIPASE: CPT

## 2023-08-18 PROCEDURE — 80053 COMPREHEN METABOLIC PANEL: CPT

## 2023-08-18 PROCEDURE — 85025 COMPLETE CBC W/AUTO DIFF WBC: CPT

## 2023-08-18 PROCEDURE — G1004 CDSM NDSC: HCPCS

## 2023-08-18 PROCEDURE — 36415 COLL VENOUS BLD VENIPUNCTURE: CPT

## 2023-08-18 PROCEDURE — 74177 CT ABD & PELVIS W/CONTRAST: CPT

## 2023-08-18 PROCEDURE — 93005 ELECTROCARDIOGRAM TRACING: CPT

## 2023-08-18 PROCEDURE — 84484 ASSAY OF TROPONIN QUANT: CPT

## 2023-08-18 PROCEDURE — 96374 THER/PROPH/DIAG INJ IV PUSH: CPT

## 2023-08-18 PROCEDURE — 99284 EMERGENCY DEPT VISIT MOD MDM: CPT

## 2023-08-18 PROCEDURE — 99285 EMERGENCY DEPT VISIT HI MDM: CPT | Performed by: EMERGENCY MEDICINE

## 2023-08-18 PROCEDURE — 96375 TX/PRO/DX INJ NEW DRUG ADDON: CPT

## 2023-08-18 RX ORDER — ONDANSETRON 2 MG/ML
4 INJECTION INTRAMUSCULAR; INTRAVENOUS ONCE
Status: COMPLETED | OUTPATIENT
Start: 2023-08-18 | End: 2023-08-18

## 2023-08-18 RX ORDER — MAGNESIUM HYDROXIDE/ALUMINUM HYDROXICE/SIMETHICONE 120; 1200; 1200 MG/30ML; MG/30ML; MG/30ML
30 SUSPENSION ORAL ONCE
Status: COMPLETED | OUTPATIENT
Start: 2023-08-18 | End: 2023-08-18

## 2023-08-18 RX ORDER — KETOROLAC TROMETHAMINE 30 MG/ML
30 INJECTION, SOLUTION INTRAMUSCULAR; INTRAVENOUS ONCE
Status: COMPLETED | OUTPATIENT
Start: 2023-08-18 | End: 2023-08-18

## 2023-08-18 RX ADMIN — IOHEXOL 100 ML: 350 INJECTION, SOLUTION INTRAVENOUS at 17:50

## 2023-08-18 RX ADMIN — ONDANSETRON 4 MG: 2 INJECTION INTRAMUSCULAR; INTRAVENOUS at 17:02

## 2023-08-18 RX ADMIN — ALUMINUM HYDROXIDE, MAGNESIUM HYDROXIDE, AND DIMETHICONE 30 ML: 200; 20; 200 SUSPENSION ORAL at 17:02

## 2023-08-18 RX ADMIN — KETOROLAC TROMETHAMINE 30 MG: 30 INJECTION, SOLUTION INTRAMUSCULAR; INTRAVENOUS at 17:02

## 2023-08-18 NOTE — ED PROVIDER NOTES
History  Chief Complaint   Patient presents with   • Abdominal Pain     PT 'I got this sharp pain like 20 mns ago but let me tell you, this soda I started drinking, it has started making me burp and I am feeling a whole lot better. Maybe I needed to burp. I need to get surgery done b/c I am pissing blood" Pt denies CP and SOB. Juanis Arroyo is a 60-year-old man presenting with 20 minutes of epigastric abdominal pain. He was laying still on his couch watching television when he suddenly began experiencing 10 out of 10 abdominal pain. He reports that this pain felt as though "someone stabbed me approximately really hard in the gut" making him feel as though he had doubled over due to the pain. He grabbed a ginger ale and chocolate causing him to burp - with burping symptoms and pain immediately dropped from a 10 out of 10 to a 5 out of 10. The pain was associated with nausea but no vomiting. His last bowel movement was last night and was normal.  Denies any trauma. Denies chest pain, shortness of breath, or lightheadedness. Brittney Fairbanks reports that he had a similar episode of pain 3 weeks ago which resolved spontaneously after 12 hours of "bearing it". He has been experiencing hailey hematuria since June in which he has a scheduled surgery for an September. He reports this is due to "my umbilical cord did not dissolve" - per chart it is urachal remnant. Brittney Fairbanks smokes 1 pack of cigarettes every other day for the past 30 years. He drinks 1/2 pack of beer occasionally on the weekend but has not drink any this week. He denies illicit drug use. He takes no medications at home. None       Past Medical History:   Diagnosis Date   • Kidney stone        History reviewed. No pertinent surgical history. History reviewed. No pertinent family history. I have reviewed and agree with the history as documented.     E-Cigarette/Vaping   • E-Cigarette Use Never User      E-Cigarette/Vaping Substances Social History     Tobacco Use   • Smoking status: Every Day     Packs/day: 0.50     Types: Cigarettes   • Smokeless tobacco: Never   Vaping Use   • Vaping Use: Never used   Substance Use Topics   • Alcohol use: Yes     Comment: rarely   • Drug use: No        Review of Systems   All other systems reviewed and are negative. Physical Exam  ED Triage Vitals [08/18/23 1615]   Temperature Pulse Respirations Blood Pressure SpO2   98.2 °F (36.8 °C) 65 20 147/100 100 %      Temp Source Heart Rate Source Patient Position - Orthostatic VS BP Location FiO2 (%)   Tympanic Monitor Sitting Left arm --      Pain Score       4             Orthostatic Vital Signs  Vitals:    08/18/23 1615 08/18/23 1849   BP: 147/100 (!) 155/101   Pulse: 65 56   Patient Position - Orthostatic VS: Sitting        Physical Exam  Constitutional:       General: He is not in acute distress. Appearance: He is not ill-appearing or diaphoretic. HENT:      Head: Normocephalic and atraumatic. Mouth/Throat:      Mouth: Mucous membranes are moist.      Pharynx: Oropharynx is clear. Cardiovascular:      Rate and Rhythm: Normal rate and regular rhythm. Heart sounds: No murmur heard. No friction rub. No gallop. Pulmonary:      Effort: Pulmonary effort is normal. No respiratory distress. Breath sounds: No stridor. No wheezing, rhonchi or rales. Abdominal:      General: Abdomen is flat. Palpations: Abdomen is soft. There is no mass. Tenderness: There is abdominal tenderness (Tenderness to superficial palpation in the epigastrium. No tenderness in any of the 4 quadrants.) in the epigastric area. There is no right CVA tenderness or left CVA tenderness. Skin:     General: Skin is warm and dry. Capillary Refill: Capillary refill takes less than 2 seconds. Neurological:      General: No focal deficit present. Mental Status: He is alert and oriented to person, place, and time.       Cranial Nerves: No cranial nerve deficit. Motor: No weakness.          ED Medications  Medications   ondansetron (ZOFRAN) injection 4 mg (4 mg Intravenous Given 8/18/23 1702)   ketorolac (TORADOL) injection 30 mg (30 mg Intravenous Given 8/18/23 1702)   aluminum-magnesium hydroxide-simethicone (MAALOX) oral suspension 30 mL (30 mL Oral Given 8/18/23 1702)   iohexol (OMNIPAQUE) 350 MG/ML injection (SINGLE-DOSE) 100 mL (100 mL Intravenous Given 8/18/23 1750)       Diagnostic Studies  Results Reviewed     Procedure Component Value Units Date/Time    HS Troponin I 2hr [[id number]]  (Normal) Collected: 08/18/23 1846    Lab Status: Final result Specimen: Blood from Arm, Right Updated: 08/18/23 1916     hs TnI 2hr 6 ng/L      Delta 2hr hsTnI 0 ng/L     HS Troponin I 4hr [[id number]]     Lab Status: No result Specimen: Blood     HS Troponin 0hr (reflex protocol) [[id number]]  (Normal) Collected: 08/18/23 1702    Lab Status: Final result Specimen: Blood from Arm, Right Updated: 08/18/23 1736     hs TnI 0hr 6 ng/L     CMP [[id number]]  (Abnormal) Collected: 08/18/23 1702    Lab Status: Final result Specimen: Blood from Arm, Right Updated: 08/18/23 1728     Sodium 142 mmol/L      Potassium 4.2 mmol/L      Chloride 104 mmol/L      CO2 31 mmol/L      ANION GAP 7 mmol/L      BUN 13 mg/dL      Creatinine 1.12 mg/dL      Glucose 110 mg/dL      Calcium 9.2 mg/dL      AST 52 U/L      ALT 35 U/L      Alkaline Phosphatase 169 U/L      Total Protein 6.6 g/dL      Albumin 4.1 g/dL      Total Bilirubin 0.46 mg/dL      eGFR 75 ml/min/1.73sq m     Narrative:      [place] guidelines for Chronic Kidney Disease (CKD):   •  Stage 1 with normal or high GFR (GFR > 90 mL/min/1.73 square meters)  •  Stage 2 Mild CKD (GFR = 60-89 mL/min/1.73 square meters)  •  Stage 3A Moderate CKD (GFR = 45-59 mL/min/1.73 square meters)  •  Stage 3B Moderate CKD (GFR = 30-44 mL/min/1.73 square meters)  •  Stage 4 Severe CKD (GFR = 15-29 mL/min/1.73 square meters)  •  Stage 5 End Stage CKD (GFR <15 mL/min/1.73 square meters)  Note: GFR calculation is accurate only with a steady state creatinine    Lipase [[id number]]  (Normal) Collected: 08/18/23 1702    Lab Status: Final result Specimen: Blood from Arm, Right Updated: 08/18/23 1728     Lipase 32 u/L     CBC and differential [[id number]]  (Abnormal) Collected: 08/18/23 1702    Lab Status: Final result Specimen: Blood from Arm, Right Updated: 08/18/23 1714     WBC 6.77 Thousand/uL      RBC 5.59 Million/uL      Hemoglobin 16.7 g/dL      Hematocrit 51.2 %      MCV 92 fL      MCH 29.9 pg      MCHC 32.6 g/dL      RDW 13.1 %      MPV 9.2 fL      Platelets 902 Thousands/uL      nRBC 0 /100 WBCs      Neutrophils Relative 61 %      Immat GRANS % 0 %      Lymphocytes Relative 28 %      Monocytes Relative 7 %      Eosinophils Relative 3 %      Basophils Relative 1 %      Neutrophils Absolute 4.12 Thousands/µL      Immature Grans Absolute 0.02 Thousand/uL      Lymphocytes Absolute 1.91 Thousands/µL      Monocytes Absolute 0.48 Thousand/µL      Eosinophils Absolute 0.18 Thousand/µL      Basophils Absolute 0.06 Thousands/µL                  CT Abdomen pelvis with contrast   Final Result by Kaleb Robertson MD (08/18 1843)      No peripancreatic inflammatory stranding to suggest acute pancreatitis   No CT findings of acute cholecystitis or biliary obstruction   No bowel obstruction         Workstation performed: [id number]               Procedures  ECG 12 Lead Documentation Only    Date/Time: 8/18/2023 5:06 PM    Performed by: Anna Lovelace MD  Authorized by: Anna Lovelace MD    Comments:      EKG demonstrating normal sinus rhythm at rate of 61 bpm.  QTc interval of 398 without ST elevation. ED Course  ED Course as of 08/18/23 1925   Fri Aug 18, 2023   1716 CBC and differential(!)  CBC without evidence of leukocytosis or anemia. Improved since last draw 4 months ago.    1735 Lipase  Lipase within normal limits reassuring against pancreatitis   1736 hs TnI 0hr: 6  Troponin of 6 will be followed up with second draw 2 hours   1907 CT Abdomen pelvis with contrast  No peripancreatic inflammatory stranding to suggest acute pancreatitis  No CT findings of acute cholecystitis or biliary obstruction  No bowel obstruction   1916 Delta 2hr hsTnI: 0  2 hr delta of 0 reassuring against myocardial injury. 1917 Patient's pain is decreased and he feels more comfortable. SBIRT 20yo+    Flowsheet Row Most Recent Value   Initial Alcohol Screen: US AUDIT-C     1. How often do you have a drink containing alcohol? 0 Filed at: 08/18/2023 1618   2. How many drinks containing alcohol do you have on a typical day you are drinking? 0 Filed at: 08/18/2023 1618   3a. Male UNDER 65: How often do you have five or more drinks on one occasion? 1 Filed at: 08/18/2023 1618   Audit-C Score 1 Filed at: 08/18/2023 1618   DAVID: How many times in the past year have you. .. Used an illegal drug or used a prescription medication for non-medical reasons? Never Filed at: 08/18/2023 8088 Liliana Mario is a 55-year-old male who presented for acute significant abdominal pain. His pain began resolving spontaneously before interview but remains significant to superficial palpation in the epigastrium. He has not vomited and has had no changes to his bowel habits. He has upcoming surgery scheduled in September for urachal remnant which is causing gross hematuria but has otherwise been in his normal state of health prior to the onset of the pain today and has had no trauma. Although he denies cardiac symptoms including shortness of breath, chest pain, and lightheadedness we will rule out MI via EKG and troponin. We will evaluate him with GI labs including CMP, CBC, lipase. We will provide symptomatic relief with Toradol, Zofran, and Maalox.   We will image using CT abdomen pelvis given significant pain on physical exam and knowledge of urachal remnant. EKG and troponin reassuring against myocardial infarction. CMP demonstrated mildly elevated alkaline phosphatase but with otherwise unremarkable. CBC and lipase were both normal.  Lipase was within normal limits reassuring against pancreatitis. CT abdomen revealed no acute processes. Patient's pain was improved on reevaluation and so he was discharged with referral to family medicine. He was counseled on warning signs signaling that he should return to the emergency department. Amount and/or Complexity of Data Reviewed  Labs: ordered. Decision-making details documented in ED Course. Radiology: ordered. Decision-making details documented in ED Course. Risk  OTC drugs. Prescription drug management. Disposition  Final diagnoses:   Epigastric pain     Time reflects when diagnosis was documented in both MDM as applicable and the Disposition within this note     Time User Action Codes Description Comment    8/18/2023  7:17 PM Ivanna Bhattit Add [R10.13] Epigastric pain       ED Disposition     ED Disposition   Discharge    Condition   Stable    Date/Time   Fri Aug 18, 2023  7:17 PM    Comment   Irvin Lee discharge to home/self care.                Follow-up Information     Follow up With Specialties Details Why Contact Info Additional [address] [facility],[address] Emergency Department Emergency Medicine  If symptoms worsen [address] [phone] [address] [facility] Emergency Department, [address] [place] [address] D[facility] Family [facility] Schedule an appointment as soon as possible for a visit in 2 days  [address] [address]  [facility] [place], [place], [address]          Patient's Medications    No medications on file     No discharge procedures on file.    PDMP Review     None           ED Provider  Attending physically available and evaluated Justin Harris. I managed the patient along with the ED Attending.     Electronically Signed by         Fran Petersen MD  08/18/23 1923

## 2023-08-18 NOTE — ED NOTES
Discharge reviewed with patient. Patient verbalized understanding and has no further questions at this time. Patient ambulatory off unit with steady gait .      Juani Weems  08/18/23 1942

## 2023-08-18 NOTE — DISCHARGE INSTRUCTIONS
You came in for your abdominal pain. Your labs and imaging were reassuring against the most dangerous causes of this sort of pain. We checked your heart and found no evidence of injury. We are glad you have started to feel better and are comfortable discharging you, but encourage you to return to the emergency department if your symptoms worsen including escalation of your pain, intractable vomiting, fever, right abdomen ability to eat. We recommend that you follow-up with your primary care physician for more comprehensive evaluation of your health.   We have referred you to family medicine at [place] ([place])
No

## 2023-08-18 NOTE — ED ATTENDING ATTESTATION
8/18/2023  I, Sandi Painter MD, saw and evaluated the patient. I have discussed the patient with the resident/non-physician practitioner and agree with the resident's/non-physician practitioner's findings, Plan of Care, and MDM as documented in the resident's/non-physician practitioner's note, except where noted. All available labs and Radiology studies were reviewed. I was present for key portions of any procedure(s) performed by the resident/non-physician practitioner and I was immediately available to provide assistance. At this point I agree with the current assessment done in the Emergency Department. I have conducted an independent evaluation of this patient a history and physical is as follows:    59-year-old male presented to the emergency department for evaluation of abdominal pain. The patient reported that approximately 20 minutes prior to arrival he developed acute onset sharp epigastric abdominal pain. He reports that immediately after the pain started he took a drink of soda and then belched several times which significantly improved his pain. He reports associated nausea. He states that he did have a similar episode about 3 weeks ago which self resolved. He denies chest pain, shortness of breath, fevers, chills, vomiting, diarrhea and recent travel. A 10 point review of systems was conducted and negative except for as stated above in the HPI    Physical Exam  Vitals and nursing note reviewed. Constitutional:       General: He is not in acute distress. Appearance: He is well-developed. HENT:      Head: Normocephalic and atraumatic. Eyes:      Conjunctiva/sclera: Conjunctivae normal.   Cardiovascular:      Rate and Rhythm: Normal rate and regular rhythm. Heart sounds: No murmur heard. Pulmonary:      Effort: Pulmonary effort is normal. No respiratory distress. Breath sounds: Normal breath sounds. Abdominal:      Palpations: Abdomen is soft.       Tenderness: There is abdominal tenderness in the epigastric area. There is no guarding or rebound. Negative signs include Soto's sign. Musculoskeletal:         General: No swelling. Cervical back: Neck supple. Skin:     General: Skin is warm and dry. Capillary Refill: Capillary refill takes less than 2 seconds. Neurological:      Mental Status: He is alert. Psychiatric:         Mood and Affect: Mood normal.             ED Course     80-year-old male presented to the emergency department for evaluation of epigastric abdominal pain. On arrival the patient was awake, alert, oriented and in no acute distress. On exam the patient had tenderness to palpation of his epigastric region. No peritoneal signs were present. CT scan of the patient's abdomen and pelvis was ordered to evaluate for acute abdominal pathology including but not limited to obstruction, perforation, pancreatitis, abscess, infection. CT scan with no acute findings. Blood work was grossly unremarkable including a delta troponin within normal limits. EKG was ordered to evaluate for acute ischemia/arrhythmia. On my interpretation EKG with a sinus rhythm with no acute ischemic changes. The patient was treated symptomatically with Toradol, Zofran and Maalox. On reevaluation the patient reported significant improvement of symptoms. All diagnostic studies were discussed with the patient in detail. He is appropriate for discharge. Patient agrees with the plan for discharge and feels comfortable to go home with proper f/u. Advised to return for worsening or additional problems. Diagnostic tests were reviewed and questions answered. Diagnosis, care plan and treatment options were discussed. The patient understands instructions and will follow up as directed.           Critical Care Time  ECG 12 Lead Documentation Only    Date/Time: 8/18/2023 5:30 PM    Performed by: Terence Mueller MD  Authorized by: Terence Mueller MD    ECG reviewed by me, the ED Provider: yes    Patient location:  ED  Previous ECG:     Previous ECG:  Unavailable    Comparison to cardiac monitor: Yes    Interpretation:     Interpretation: normal    Rate:     ECG rate assessment: normal    Rhythm:     Rhythm: sinus rhythm    Ectopy:     Ectopy: none    QRS:     QRS axis:  Normal  Conduction:     Conduction: normal    ST segments:     ST segments:  Normal  T waves:     T waves: normal

## 2023-08-19 LAB
ATRIAL RATE: 61 BPM
P AXIS: 49 DEGREES
PR INTERVAL: 158 MS
QRS AXIS: 2 DEGREES
QRSD INTERVAL: 90 MS
QT INTERVAL: 396 MS
QTC INTERVAL: 398 MS
T WAVE AXIS: 28 DEGREES
VENTRICULAR RATE: 61 BPM

## 2023-08-19 PROCEDURE — 93010 ELECTROCARDIOGRAM REPORT: CPT | Performed by: INTERNAL MEDICINE

## 2023-09-29 ENCOUNTER — TELEPHONE (OUTPATIENT)
Dept: UROLOGY | Facility: CLINIC | Age: 53
End: 2023-09-29

## 2023-09-29 NOTE — TELEPHONE ENCOUNTER
Patient no-showed for his third cystoscopy appointment today. Please see my prior telephone note 30th    I do not think this patient should be scheduled again. Is not fair to other patients were waiting for office procedures. ..

## 2023-10-25 ENCOUNTER — TELEPHONE (OUTPATIENT)
Dept: UROLOGY | Facility: CLINIC | Age: 53
End: 2023-10-25

## 2023-12-01 ENCOUNTER — APPOINTMENT (EMERGENCY)
Dept: RADIOLOGY | Facility: HOSPITAL | Age: 53
End: 2023-12-01
Payer: MEDICARE

## 2023-12-01 ENCOUNTER — HOSPITAL ENCOUNTER (EMERGENCY)
Facility: HOSPITAL | Age: 53
Discharge: HOME | End: 2023-12-01
Attending: EMERGENCY MEDICINE
Payer: MEDICARE

## 2023-12-01 VITALS
SYSTOLIC BLOOD PRESSURE: 148 MMHG | TEMPERATURE: 97.7 F | RESPIRATION RATE: 20 BRPM | DIASTOLIC BLOOD PRESSURE: 99 MMHG | BODY MASS INDEX: 28.14 KG/M2 | HEIGHT: 69 IN | HEART RATE: 67 BPM | OXYGEN SATURATION: 99 % | WEIGHT: 190 LBS

## 2023-12-01 DIAGNOSIS — S46.002A ROTATOR CUFF INJURY, LEFT, INITIAL ENCOUNTER: Primary | ICD-10-CM
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
## 2023-12-01 PROCEDURE — 99283 EMERGENCY DEPT VISIT LOW MDM: CPT

## 2023-12-01 PROCEDURE — 73030 X-RAY EXAM OF SHOULDER: CPT

## 2023-12-01 PROCEDURE — 96372 THER/PROPH/DIAG INJ SC/IM: CPT

## 2023-12-01 PROCEDURE — 99284 EMERGENCY DEPT VISIT MOD MDM: CPT | Performed by: EMERGENCY MEDICINE

## 2023-12-01 RX ORDER — ACETAMINOPHEN 325 MG/1
975 TABLET ORAL ONCE
Status: COMPLETED | OUTPATIENT
Start: 2023-12-01 | End: 2023-12-01

## 2023-12-01 RX ORDER — LIDOCAINE 50 MG/G
1 PATCH TOPICAL ONCE
Status: DISCONTINUED | OUTPATIENT
Start: 2023-12-01 | End: 2023-12-01 | Stop reason: HOSPADM

## 2023-12-01 RX ORDER — NAPROXEN 500 MG/1
500 TABLET ORAL 2 TIMES DAILY WITH MEALS
Qty: 30 TABLET | Refills: 0 | Status: SHIPPED | OUTPATIENT
Start: 2023-12-01

## 2023-12-01 RX ORDER — LIDOCAINE 50 MG/G
1 PATCH TOPICAL DAILY
Qty: 30 PATCH | Refills: 0 | Status: SHIPPED | OUTPATIENT
Start: 2023-12-01

## 2023-12-01 RX ORDER — METHOCARBAMOL 500 MG/1
500 TABLET, FILM COATED ORAL 3 TIMES DAILY PRN
Qty: 20 TABLET | Refills: 0 | Status: SHIPPED | OUTPATIENT
Start: 2023-12-01

## 2023-12-01 RX ORDER — KETOROLAC TROMETHAMINE 30 MG/ML
15 INJECTION, SOLUTION INTRAMUSCULAR; INTRAVENOUS ONCE
Status: COMPLETED | OUTPATIENT
Start: 2023-12-01 | End: 2023-12-01

## 2023-12-01 RX ORDER — ACETAMINOPHEN 500 MG
1000 TABLET ORAL EVERY 6 HOURS PRN
Qty: 40 TABLET | Refills: 0 | Status: SHIPPED | OUTPATIENT
Start: 2023-12-01

## 2023-12-01 RX ADMIN — ACETAMINOPHEN 975 MG: 325 TABLET, FILM COATED ORAL at 09:48

## 2023-12-01 RX ADMIN — KETOROLAC TROMETHAMINE 15 MG: 30 INJECTION INTRAMUSCULAR; INTRAVENOUS at 09:49

## 2023-12-01 RX ADMIN — LIDOCAINE 1 PATCH: 700 PATCH TOPICAL at 10:35

## 2023-12-01 NOTE — ED PROVIDER NOTES
History  Chief Complaint   Patient presents with    Shoulder Injury     Pt "Last night I was walking down the stairs and I was on my phone when I fell from the bottom on the stairs. tSas Palmaer directly on my left shoulder and it took the whole hit, I think I might have popped it out of the socket" Decrease ROM. -LOC     59-year-old male history of nephrolithiasis, urachal remnant. Presents for fall down stairs/left shoulder pain. Was ambulating down the stairs yesterday evening. Got to the bottom of the staircase. Approximately 2 stairs from bottom. Was on his telephone and did not notice that there was a case of soda placed on the bottom stair. He tripped over the soda and landed directly onto his left shoulder. Denies head strike, neurological deficits, nausea, vomiting. Fall  Mechanism of injury: fall    Injury location:  Shoulder/arm  Shoulder/arm injury location:  L shoulder  Arrived directly from scene: no        None       Past Medical History:   Diagnosis Date    Kidney stone        No past surgical history on file. No family history on file. I have reviewed and agree with the history as documented. E-Cigarette/Vaping    E-Cigarette Use Never User      E-Cigarette/Vaping Substances     Social History     Tobacco Use    Smoking status: Every Day     Packs/day: 0.50     Types: Cigarettes    Smokeless tobacco: Never   Vaping Use    Vaping Use: Never used   Substance Use Topics    Alcohol use: Not Currently     Comment: rarely    Drug use: No       Review of Systems   Musculoskeletal:  Positive for arthralgias. All other systems reviewed and are negative. Physical Exam  Physical Exam  Vitals and nursing note reviewed. Constitutional:       General: He is not in acute distress. Appearance: He is well-developed. He is not diaphoretic. HENT:      Head: Normocephalic and atraumatic.       Right Ear: External ear normal.      Left Ear: External ear normal.   Eyes: Conjunctiva/sclera: Conjunctivae normal.   Neck:      Trachea: No tracheal deviation. Cardiovascular:      Rate and Rhythm: Normal rate and regular rhythm. Heart sounds: Normal heart sounds. No murmur heard. Comments: Normal radial pulse. Pulmonary:      Effort: No respiratory distress. Breath sounds: Normal breath sounds. No stridor. No wheezing or rales. Abdominal:      General: Bowel sounds are normal. There is no distension. Palpations: Abdomen is soft. There is no mass. Tenderness: There is no abdominal tenderness. There is no guarding or rebound. Musculoskeletal:         General: Tenderness present. No deformity. Comments: Patient has a limited exam in regards to the left shoulder secondary to pain. Minimal ability to flex the shoulder or abduct the shoulder secondary to pain. Tenderness to palpation over the left anterior and left superior shoulder. No obvious deformity. No C, T, L-spine tenderness. No pain on palpation of the thoracic cage. Skin:     General: Skin is dry. Findings: No rash. Neurological:      General: No focal deficit present. Cranial Nerves: No cranial nerve deficit. Sensory: No sensory deficit. Motor: No weakness or abnormal muscle tone. Coordination: Coordination normal.      Gait: Gait normal.      Comments: Normal sensation throughout the left upper extremity. Normal median, radial, ulnar, recurrent median nerves in regards to sensation and motor. Psychiatric:         Behavior: Behavior normal.         Thought Content:  Thought content normal.         Judgment: Judgment normal.         Vital Signs  ED Triage Vitals [12/01/23 0930]   Temperature Pulse Respirations Blood Pressure SpO2   97.7 °F (36.5 °C) 67 20 148/99 99 %      Temp Source Heart Rate Source Patient Position - Orthostatic VS BP Location FiO2 (%)   Oral Monitor Sitting Right arm --      Pain Score       10 - Worst Possible Pain Vitals:    12/01/23 0930   BP: 148/99   Pulse: 67   Patient Position - Orthostatic VS: Sitting         Visual Acuity      ED Medications  Medications   ketorolac (TORADOL) injection 15 mg (15 mg Intramuscular Given 12/1/23 0949)   acetaminophen (TYLENOL) tablet 975 mg (975 mg Oral Given 12/1/23 0948)       Diagnostic Studies  Results Reviewed       None                   XR shoulder 2+ views LEFT   ED Interpretation by Andrés Hickman DO (12/01 1016)   No acute orthopedic findings. Final Result by Jerod Salcedo MD (12/01 1120)      No acute osseous abnormality. Mild glenohumeral and acromioclavicular osteoarthrosis. Workstation performed: [id number]                    Procedures  Procedures         ED Course                                             Medical Decision Making  Patient with fall and left shoulder. Denies striking his head. No reason for imaging of the brain. Will evaluate for fracture, dislocation. Patient is neurovascularly intact. 3 without acute findings. Will discharge home. Follow-up with orthopedics. Likely rotator cuff injury. Return precautions discussed. Problems Addressed:  Fall down stairs: acute illness or injury  Rotator cuff injury, left, initial encounter: acute illness or injury    Amount and/or Complexity of Data Reviewed  Radiology: ordered and independent interpretation performed. Risk  OTC drugs. Prescription drug management.              Disposition  Final diagnoses:   Rotator cuff injury, left, initial encounter   Fall down stairs     Time reflects when diagnosis was documented in both MDM as applicable and the Disposition within this note       Time User Action Codes Description Comment    12/1/2023 10:27 AM Thom Hung Add [S46.002A] Rotator cuff injury, left, initial encounter     12/1/2023 10:28 AM Alvarez Tran Add [W10.8XXA] Fall down stairs           ED Disposition       ED Disposition   Discharge    Condition   Stable Date/Time   Fri Dec 1, 2023 10:26 AM    Comment   Yusuf Specter discharge to home/self care.                    Follow-up Information       Follow up With Specialties Details Why Contact Info Additional [address] [facility] Orthopedic Surgery Schedule an appointment as soon as possible for a visit  For re-evaluation as soon as possible [place] [address]  [phone] [address] [facility] [place], [address] [facility] [phone]    [facility] Emergency Department Emergency Medicine  If symptoms worsen [place] [address] [address] [facility] Emergency Department, [address] [place] [address] [address], [address] [facility]            Discharge Medication List as of 12/1/2023 10:30 AM        START taking these medications    Details   acetaminophen (TYLENOL) 500 mg tablet Take 2 tablets (1,000 mg total) by mouth every 6 (six) hours as needed for moderate pain, Starting Fri 12/1/2023, Normal      lidocaine (Lidoderm) 5 % Apply 1 patch topically over 12 hours daily Remove & Discard patch within 12 hours or as directed by MD, Starting Fri 12/1/2023, Normal      methocarbamol (ROBAXIN) 500 mg tablet Take 1 tablet (500 mg total) by mouth 3 (three) times a day as needed for muscle spasms, Starting Fri 12/1/2023, Normal      naproxen (Naprosyn) 500 mg tablet Take 1 tablet (500 mg total) by mouth 2 (two) times a day with meals, Starting Fri 12/1/2023, Normal                 PDMP Review       None            ED Provider  Electronically Signed by             Audrey Ha DO  12/01/23 0280

## 2023-12-01 NOTE — DISCHARGE INSTRUCTIONS
Follow-up with orthopedic surgery. X-ray showed no abnormalities per my read. Radiology will read your x-ray at a later point. If they note any discrepancy you will be called immediately. Take Tylenol every 6 hours, naproxen every 12 hours. Use the Robaxin muscle relaxer up to 3 times daily. This medication will make you sleepy. Do not take and drive. Use lidocaine patches 12 hours on then 12 hours off. If your insurance does not cover the lidocaine patches you can buy over-the-counter lidocaine patches under the brand Salonpas or ViViFi.

## 2024-12-05 ENCOUNTER — PREP FOR PROCEDURE (OUTPATIENT)
Age: 54
End: 2024-12-05

## 2024-12-05 ENCOUNTER — TELEPHONE (OUTPATIENT)
Age: 54
End: 2024-12-05

## 2024-12-05 DIAGNOSIS — Z12.11 SCREENING FOR COLON CANCER: Primary | ICD-10-CM

## 2024-12-05 NOTE — TELEPHONE ENCOUNTER
12/05/24  Screened by: Aida Jernigan MA    Referring Provider     Pre- Screening:     There is no height or weight on file to calculate BMI.    Has patient been referred for a routine screening Colonoscopy? yes  Is the patient between 45-75 years old? yes      Previous Colonoscopy no   If yes:    Date:     Facility:     Reason:         Does the patient want to see a Gastroenterologist prior to their procedure OR are they having any GI symptoms? no    Has the patient been hospitalized or had abdominal surgery in the past 6 months? no    Does the patient use supplemental oxygen? no    Does the patient take Coumadin, Lovenox, Plavix, Elliquis, Xarelto, or other blood thinning medication? no    Has the patient had a stroke, cardiac event, or stent placed in the past year? no      If patient is between 45yrs - 49yrs, please advise patient that we will have to confirm benefits & coverage with their insurance company for a routine screening colonoscopy.

## 2024-12-05 NOTE — TELEPHONE ENCOUNTER
Miralax/Dulcolax prep instructions have been faxed to [phone] for patients 1/22/25 Colonoscopy.  Received the confirmation.

## 2025-01-22 ENCOUNTER — ANESTHESIA EVENT (OUTPATIENT)
Dept: GASTROENTEROLOGY | Facility: HOSPITAL | Age: 55
End: 2025-01-22
Payer: OTHER GOVERNMENT

## 2025-01-22 ENCOUNTER — HOSPITAL ENCOUNTER (OUTPATIENT)
Dept: GASTROENTEROLOGY | Facility: HOSPITAL | Age: 55
Setting detail: OUTPATIENT SURGERY
Discharge: HOME | End: 2025-01-22
Attending: INTERNAL MEDICINE
Payer: OTHER GOVERNMENT

## 2025-01-22 ENCOUNTER — ANESTHESIA (OUTPATIENT)
Dept: GASTROENTEROLOGY | Facility: HOSPITAL | Age: 55
End: 2025-01-22
Payer: OTHER GOVERNMENT

## 2025-01-22 VITALS
OXYGEN SATURATION: 98 % | WEIGHT: 229 LBS | TEMPERATURE: 97.2 F | SYSTOLIC BLOOD PRESSURE: 124 MMHG | DIASTOLIC BLOOD PRESSURE: 82 MMHG | BODY MASS INDEX: 34.71 KG/M2 | HEART RATE: 67 BPM | HEIGHT: 68 IN | RESPIRATION RATE: 14 BRPM

## 2025-01-22 DIAGNOSIS — Z12.11 SCREENING FOR COLON CANCER: ICD-10-CM

## 2025-01-22 PROCEDURE — G0121 COLON CA SCRN NOT HI RSK IND: HCPCS | Performed by: INTERNAL MEDICINE

## 2025-01-22 RX ORDER — SODIUM CHLORIDE, SODIUM LACTATE, POTASSIUM CHLORIDE, CALCIUM CHLORIDE 600; 310; 30; 20 MG/100ML; MG/100ML; MG/100ML; MG/100ML
INJECTION, SOLUTION INTRAVENOUS CONTINUOUS PRN
Status: DISCONTINUED | OUTPATIENT
Start: 2025-01-22 | End: 2025-01-22

## 2025-01-22 RX ORDER — PROPOFOL 10 MG/ML
INJECTION, EMULSION INTRAVENOUS AS NEEDED
Status: DISCONTINUED | OUTPATIENT
Start: 2025-01-22 | End: 2025-01-22

## 2025-01-22 RX ORDER — LIDOCAINE HYDROCHLORIDE 20 MG/ML
INJECTION, SOLUTION EPIDURAL; INFILTRATION; INTRACAUDAL; PERINEURAL AS NEEDED
Status: DISCONTINUED | OUTPATIENT
Start: 2025-01-22 | End: 2025-01-22

## 2025-01-22 RX ADMIN — LIDOCAINE HYDROCHLORIDE 100 MG: 20 INJECTION, SOLUTION EPIDURAL; INFILTRATION; INTRACAUDAL; PERINEURAL at 10:20

## 2025-01-22 RX ADMIN — PROPOFOL 25 MG: 10 INJECTION, EMULSION INTRAVENOUS at 10:23

## 2025-01-22 RX ADMIN — PROPOFOL 25 MG: 10 INJECTION, EMULSION INTRAVENOUS at 10:24

## 2025-01-22 RX ADMIN — PROPOFOL 25 MG: 10 INJECTION, EMULSION INTRAVENOUS at 10:28

## 2025-01-22 RX ADMIN — PROPOFOL 25 MG: 10 INJECTION, EMULSION INTRAVENOUS at 10:22

## 2025-01-22 RX ADMIN — PROPOFOL 150 MG: 10 INJECTION, EMULSION INTRAVENOUS at 10:20

## 2025-01-22 RX ADMIN — PROPOFOL 25 MG: 10 INJECTION, EMULSION INTRAVENOUS at 10:25

## 2025-01-22 RX ADMIN — SODIUM CHLORIDE, SODIUM LACTATE, POTASSIUM CHLORIDE, AND CALCIUM CHLORIDE: .6; .31; .03; .02 INJECTION, SOLUTION INTRAVENOUS at 10:17

## 2025-01-22 NOTE — ANESTHESIA POSTPROCEDURE EVALUATION
Post-Op Assessment Note    CV Status:  Stable    Pain management: adequate       Mental Status:  Lethargic and sleepy   Hydration Status:  Stable   PONV Controlled:  None   Airway Patency:  Patent     Post Op Vitals Reviewed: Yes    No anethesia notable event occurred.    Staff: CRNA           Last Filed PACU Vitals:  Vitals Value Taken Time   Temp     Pulse     BP     Resp     SpO2

## 2025-01-22 NOTE — ANESTHESIA PREPROCEDURE EVALUATION
Procedure:  COLONOSCOPY    Relevant Problems   /RENAL   (+) Nephrolithiasis        Physical Exam    Airway    Mallampati score: II  TM Distance: >3 FB  Neck ROM: full     Dental   No notable dental hx     Cardiovascular  Cardiovascular exam normal    Pulmonary  Pulmonary exam normal     Other Findings        Anesthesia Plan  ASA Score- 2     Anesthesia Type- IV sedation with anesthesia with ASA Monitors.         Additional Monitors:     Airway Plan:            Plan Factors-Exercise tolerance (METS): >4 METS.    Chart reviewed.   Existing labs reviewed. Patient summary reviewed.    Patient is not a current smoker.      Obstructive sleep apnea risk education given perioperatively.        Induction-     Postoperative Plan-     Perioperative Resuscitation Plan - Level 1 - Full Code.       Informed Consent- Anesthetic plan and risks discussed with patient.  I personally reviewed this patient with the CRNA. Discussed and agreed on the Anesthesia Plan with the CRNA..      NPO Status:  Vitals Value Taken Time   Date of last liquid 01/21/25 01/22/25 0918   Time of last liquid 2330 01/22/25 0918   Date of last solid 01/20/25 01/22/25 0918   Time of last solid 2100 01/22/25 0918